# Patient Record
Sex: MALE | URBAN - NONMETROPOLITAN AREA
[De-identification: names, ages, dates, MRNs, and addresses within clinical notes are randomized per-mention and may not be internally consistent; named-entity substitution may affect disease eponyms.]

---

## 2018-04-17 ENCOUNTER — APPOINTMENT (OUTPATIENT)
Age: 78
Setting detail: DERMATOLOGY
End: 2018-04-17

## 2018-04-17 DIAGNOSIS — L57.0 ACTINIC KERATOSIS: ICD-10-CM

## 2018-04-17 DIAGNOSIS — L40.0 PSORIASIS VULGARIS: ICD-10-CM

## 2018-04-17 DIAGNOSIS — L82.1 OTHER SEBORRHEIC KERATOSIS: ICD-10-CM

## 2018-04-17 DIAGNOSIS — L82.0 INFLAMED SEBORRHEIC KERATOSIS: ICD-10-CM

## 2018-04-17 PROBLEM — L30.9 DERMATITIS, UNSPECIFIED: Status: ACTIVE | Noted: 2018-04-17

## 2018-04-17 PROBLEM — J30.1 ALLERGIC RHINITIS DUE TO POLLEN: Status: ACTIVE | Noted: 2018-04-17

## 2018-04-17 PROBLEM — I10 ESSENTIAL (PRIMARY) HYPERTENSION: Status: ACTIVE | Noted: 2018-04-17

## 2018-04-17 PROBLEM — M12.9 ARTHROPATHY, UNSPECIFIED: Status: ACTIVE | Noted: 2018-04-17

## 2018-04-17 PROCEDURE — OTHER COUNSELING: OTHER

## 2018-04-17 PROCEDURE — OTHER REASSURANCE: OTHER

## 2018-04-17 PROCEDURE — OTHER PRESCRIPTION: OTHER

## 2018-04-17 PROCEDURE — OTHER MIPS QUALITY: OTHER

## 2018-04-17 PROCEDURE — OTHER LIQUID NITROGEN: OTHER

## 2018-04-17 PROCEDURE — OTHER TREATMENT REGIMEN: OTHER

## 2018-04-17 PROCEDURE — 99214 OFFICE O/P EST MOD 30 MIN: CPT | Mod: 25

## 2018-04-17 PROCEDURE — 17003 DESTRUCT PREMALG LES 2-14: CPT

## 2018-04-17 PROCEDURE — 17000 DESTRUCT PREMALG LESION: CPT | Mod: 59

## 2018-04-17 PROCEDURE — 17110 DESTRUCT B9 LESION 1-14: CPT

## 2018-04-17 PROCEDURE — OTHER EDUCATIONAL RESOURCES PROVIDED: OTHER

## 2018-04-17 RX ORDER — MOMETASONE FUROATE 1 MG/G
APPLY OINTMENT TOPICAL BID
Qty: 1 | Refills: 1 | Status: ERX | COMMUNITY
Start: 2018-04-17

## 2018-04-17 ASSESSMENT — LOCATION DETAILED DESCRIPTION DERM
LOCATION DETAILED: LEFT BUTTOCK
LOCATION DETAILED: RIGHT SUPERIOR LATERAL LOWER BACK
LOCATION DETAILED: LEFT LATERAL KNEE
LOCATION DETAILED: RIGHT SUPERIOR MEDIAL FOREHEAD
LOCATION DETAILED: LEFT INFERIOR FLANK
LOCATION DETAILED: RIGHT MEDIAL FRONTAL SCALP
LOCATION DETAILED: RIGHT ELBOW
LOCATION DETAILED: RIGHT LATERAL BUTTOCK
LOCATION DETAILED: LEFT ELBOW
LOCATION DETAILED: RIGHT CENTRAL FRONTAL SCALP
LOCATION DETAILED: LEFT LATERAL BUTTOCK

## 2018-04-17 ASSESSMENT — LOCATION ZONE DERM
LOCATION ZONE: TRUNK
LOCATION ZONE: SCALP
LOCATION ZONE: FACE
LOCATION ZONE: LEG
LOCATION ZONE: ARM

## 2018-04-17 ASSESSMENT — LOCATION SIMPLE DESCRIPTION DERM
LOCATION SIMPLE: RIGHT FOREHEAD
LOCATION SIMPLE: RIGHT ELBOW
LOCATION SIMPLE: ABDOMEN
LOCATION SIMPLE: LEFT ELBOW
LOCATION SIMPLE: RIGHT LOWER BACK
LOCATION SIMPLE: LEFT KNEE
LOCATION SIMPLE: RIGHT BUTTOCK
LOCATION SIMPLE: LEFT BUTTOCK
LOCATION SIMPLE: RIGHT SCALP

## 2018-04-17 NOTE — HPI: RASH
Is This A New Presentation, Or A Follow-Up?: Rash
Additional History: He has tried mometasone 0.1% ointment for about 10 days and it seemed to be helping, but he has not been using it for about the last 10 days.

## 2018-04-17 NOTE — PROCEDURE: MIPS QUALITY
Quality 130: Documentation Of Current Medications In The Medical Record: Current Medications Documented
Quality 226: Preventive Care And Screening: Tobacco Use: Screening And Cessation Intervention: Patient screened for tobacco and is an ex-smoker
Quality 431: Preventive Care And Screening: Unhealthy Alcohol Use - Screening: Patient screened for unhealthy alcohol use using a single question and scores less than 2 times per year
Quality 110: Preventive Care And Screening: Influenza Immunization: Influenza Immunization Administered during Influenza season
Detail Level: Detailed
Quality 111:Pneumonia Vaccination Status For Older Adults: Pneumococcal Vaccination Previously Received

## 2018-04-17 NOTE — PROCEDURE: LIQUID NITROGEN
Consent: The patient's consent was obtained including but not limited to risks of crusting, scabbing, blistering, scarring, darker or lighter pigmentary change, recurrence, incomplete removal and infection.
Duration Of Freeze Thaw-Cycle (Seconds): 0
Add 52 Modifier (Optional): no
Include Z78.9 (Other Specified Conditions Influencing Health Status) As An Associated Diagnosis?: Yes
Post-Care Instructions: I reviewed with the patient in detail post-care instructions.
Post-Care Instructions: I reviewed with the patient in detail post-care instructions. Patient is to wear sunprotection. Contact Office If actinic keratoses fail to resolve despite treatment, or if you develop a side effect from therapy, such as unbearable crusting, scabbing, redness and tenderness. Return if you notice new similar scaling papules.
Detail Level: Detailed
Medical Necessity Information: It is in your best interest to select a reason for this procedure from the list below. All of these items fulfill various CMS LCD requirements except the new and changing color options.
Medical Necessity Clause: This procedure was medically necessary because the lesion that was treated was:
Total Number Of Aks Treated: 3

## 2018-04-17 NOTE — PROCEDURE: TREATMENT REGIMEN
Start Regimen: mometasone 1% ointment- Apply twice daily to affected areas on back, buttocks, and thighs. Do not use on face, groin or armpits Initiate Regimen: mometasone 1% ointment- Apply twice daily to affected areas on back, buttocks, and thighs. Do not use on face, groin or armpits

## 2018-06-19 ENCOUNTER — APPOINTMENT (OUTPATIENT)
Age: 78
Setting detail: DERMATOLOGY
End: 2018-06-27

## 2018-06-19 DIAGNOSIS — L57.0 ACTINIC KERATOSIS: ICD-10-CM

## 2018-06-19 PROBLEM — D48.5 NEOPLASM OF UNCERTAIN BEHAVIOR OF SKIN: Status: ACTIVE | Noted: 2018-06-19

## 2018-06-19 PROCEDURE — OTHER EDUCATIONAL RESOURCES PROVIDED: OTHER

## 2018-06-19 PROCEDURE — 17000 DESTRUCT PREMALG LESION: CPT

## 2018-06-19 PROCEDURE — 11100: CPT | Mod: 59

## 2018-06-19 PROCEDURE — OTHER COUNSELING: OTHER

## 2018-06-19 PROCEDURE — OTHER MIPS QUALITY: OTHER

## 2018-06-19 PROCEDURE — OTHER LIQUID NITROGEN: OTHER

## 2018-06-19 PROCEDURE — OTHER BIOPSY BY SHAVE METHOD AND DESTRUCTION: OTHER

## 2018-06-19 ASSESSMENT — LOCATION DETAILED DESCRIPTION DERM: LOCATION DETAILED: LEFT CENTRAL FRONTAL SCALP

## 2018-06-19 ASSESSMENT — LOCATION ZONE DERM: LOCATION ZONE: SCALP

## 2018-06-19 ASSESSMENT — LOCATION SIMPLE DESCRIPTION DERM: LOCATION SIMPLE: LEFT SCALP

## 2018-06-19 NOTE — PROCEDURE: MIPS QUALITY
Quality 431: Preventive Care And Screening: Unhealthy Alcohol Use - Screening: Patient screened for unhealthy alcohol use using a single question and scores less than 2 times per year
Quality 111:Pneumonia Vaccination Status For Older Adults: Pneumococcal Vaccination Previously Received
Quality 130: Documentation Of Current Medications In The Medical Record: Current Medications Documented
Quality 110: Preventive Care And Screening: Influenza Immunization: Influenza Immunization previously received during influenza season
Quality 226: Preventive Care And Screening: Tobacco Use: Screening And Cessation Intervention: Patient screened for tobacco and is an ex-smoker
Detail Level: Detailed

## 2018-06-19 NOTE — PROCEDURE: BIOPSY BY SHAVE METHOD AND DESTRUCTION
Biopsy Type: H and E
Hemostasis: Electrocautery
Render Post-Care Instructions In Note?: yes
Anesthesia Volume In Cc: 2
Billing Type: Third-Party Bill
Size Of Lesion In Cm (Optional): 0.7
Bill As?: Biopsy by Shave Method
Destruction Type: electrodesiccation
Bill For Surgical Tray: no
Consent: Written consent was obtained and risks were reviewed including but not limited to scarring, infection, bleeding, scabbing, incomplete removal, nerve damage and allergy to anesthesia.
Number Of Curettages: 3
Post-Care Instructions: I reviewed with the patient in detail post-care instructions. Patient is to keep the original bandage on overnight.  Once daily cleanse area gently with soap and water, apply vaseline to wound, cover with non-stick bandage  Continue wound care until healed.
Size Of Lesion After Curettage: 1
Detail Level: Detailed
Notification Instructions: Patient will be notified of biopsy results. However, patient instructed to call the office if not contacted within 2 weeks.
Anesthesia Type: 1% lidocaine with epinephrine and a 1:10 solution of 8.4% sodium bicarbonate
Wound Care: Vaseline

## 2018-06-19 NOTE — PROCEDURE: LIQUID NITROGEN
Render Post-Care Instructions In Note?: no
Consent: The patient's consent was obtained including but not limited to risks of crusting, scabbing, blistering, scarring, darker or lighter pigmentary change, recurrence, incomplete removal and infection. The patient was advised to return if the lesion fails to clear with treatment.
Post-Care Instructions: I reviewed with the patient in detail post-care instructions
Duration Of Freeze Thaw-Cycle (Seconds): 0
Detail Level: Detailed

## 2021-03-16 NOTE — HPI: SKIN LESION
Has Your Skin Lesion Been Treated?: not been treated
Is This A New Presentation, Or A Follow-Up?: Skin Lesion
none